# Patient Record
Sex: MALE | Race: BLACK OR AFRICAN AMERICAN | Employment: UNEMPLOYED | URBAN - METROPOLITAN AREA
[De-identification: names, ages, dates, MRNs, and addresses within clinical notes are randomized per-mention and may not be internally consistent; named-entity substitution may affect disease eponyms.]

---
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
## 2025-05-08 ENCOUNTER — TELEPHONE (OUTPATIENT)
Age: 20
End: 2025-05-08

## 2025-05-08 NOTE — TELEPHONE ENCOUNTER
Pt's mom called starting that Dr. Will was wanting to follow up in 2 years. She would like to schedule an appt. Please advise.